# Patient Record
(demographics unavailable — no encounter records)

---

## 2024-10-29 NOTE — ASSESSMENT
[FreeTextEntry1] :  cough  Is most likely related to her postnasal drip.  Exam was consistent with narrow nasal passages.  Instructed the patient to follow-up with her ENT.  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  The patient will need a follow-up on her CT scan to see if there is any progression of the underlying process.  PFT was not consistent with moderate restrictive lung disease and normal diffusion capacity.  The 6-minute walk test was normal.  At this point the pulmonary status is stable.  No evidence of air trapping on the PFT  Snoring  I discussed the short and long term health effect of the obstructive seep apnea with the patient. These effects include, but not limited to, uncontrolled hypertension, CAD, arrhythmias, sudden death, CVA, and pulmonary hypertension. I advised the patient to avoid sedatives, narcotics, driving, and sleeping pills in the meantime. I discussed the therapeutic options including but not limited to CPAP, surgery, and oral appliance. Further recommendations will follow after sleep study.

## 2024-10-29 NOTE — HISTORY OF PRESENT ILLNESS
[Never] : never [TextBox_4] : she has a cough and is productive but scattered during the day [Awakes Unrefreshed] : awakes unrefreshed [Awakes with Dry Mouth] : awakes with dry mouth [Awakes with Headache] : does not awaken with headache [Daytime Somnolence] : daytime somnolence [Difficulty Initiating Sleep] : does not have difficulty initiating sleep [Difficulty Maintaining Sleep] : does not have difficulty maintaining sleep [Fatigue] : no fatigue [Frequent Nocturnal Awakening] : denies frequent nocturnal awakening [Snoring] : snoring [Unintentional Sleep while Active] : no unintentional sleep while active [Unintentional Sleep while Inactive] : no unintentional sleep while inactive

## 2025-06-23 NOTE — HISTORY OF PRESENT ILLNESS
[FreeTextEntry1] : 512y/o [id number] LMP  here for today, we. no abn bleeding, pain or discharge. still regular periods. saw stain today, one week earlier than usual, but has been busy preparing for summer.  Mammo 2024  normal. colonoscopy 2025 normal.    nsd x 6, largest 6-11 lbs, stop x 1    no med or surg hx  nkda  nonsmoker

## 2025-06-23 NOTE — PLAN
[FreeTextEntry1] : 53 y/o p6 with normal exam and irregular menstrual cycle gc sent from office bse/calcium labs sent from office for mammo/sono screeining discussed b.c. reviewed precautions f/u for annual

## 2025-06-23 NOTE — PHYSICAL EXAM
[MA] : MA [Appropriately responsive] : appropriately responsive [Alert] : alert [No Acute Distress] : no acute distress [No Lymphadenopathy] : no lymphadenopathy [Soft] : soft [Non-tender] : non-tender [Non-distended] : non-distended [No HSM] : No HSM [No Lesions] : no lesions [No Mass] : no mass [Oriented x3] : oriented x3 [Examination Of The Breasts] : a normal appearance [No Masses] : no breast masses were palpable [Labia Majora] : normal [Labia Minora] : normal [Uterine Adnexae] : normal [Normal rectal exam] : was normal [Normal Brown Stool] : was normal and brown [Normal] : was normal [None] : there was no rectal mass  [FreeTextEntry2] : Gurmeet [Internal Hemorrhoid] : no internal hemorrhoids were present [External Hemorrhoid] : no external hemorrhoids were present [Skin Tags] : no residual hemorrhoidal skin tags